# Patient Record
Sex: MALE | Race: WHITE
[De-identification: names, ages, dates, MRNs, and addresses within clinical notes are randomized per-mention and may not be internally consistent; named-entity substitution may affect disease eponyms.]

---

## 2020-01-30 LAB
BUN BLD-MCNC: 19 MG/DL (ref 7–18)
GLUCOSE SERPLBLD-MCNC: 101 MG/DL (ref 74–106)
HCT VFR BLD CALC: 42.2 % (ref 39.6–49)
LYMPHOCYTES # SPEC AUTO: 2.2 K/UL (ref 0.7–4.9)
PMV BLD: 8.5 FL (ref 7.6–11.3)
POTASSIUM SERPL-SCNC: 3.9 MMOL/L (ref 3.5–5.1)
RBC # BLD: 4.58 M/UL (ref 4.33–5.43)

## 2020-01-30 NOTE — RAD REPORT
EXAM DESCRIPTION:  RAD - Chest Pa And Lat (2 Views) - 1/30/2020 3:17 pm

 

CLINICAL HISTORY:  pre op, pending soft tissue mass removal from the back

 

COMPARISON:  No comparisons

 

TECHNIQUE:  Frontal and lateral views of the chest were obtained.

 

FINDINGS:  The lungs are clear.   Heart size is normal and central vasculature is within normal limit
s.  No pleural effusion or pneumothorax seen.  No acute bony finding noted.  No aortic abnormality.

 

IMPRESSION:  No acute cardiopulmonary process.

## 2020-01-31 NOTE — EKG
Test Date:    2020-01-30               Test Time:    15:06:52

Technician:   VIKI                                   

                                                     

MEASUREMENT RESULTS:                                       

Intervals:                                           

Rate:         61                                     

WA:           140                                    

QRSD:         90                                     

QT:           398                                    

QTc:          400                                    

Axis:                                                

P:            17                                     

WA:           140                                    

QRS:          2                                      

T:            10                                     

                                                     

INTERPRETIVE STATEMENTS:                                       

                                                     

Normal sinus rhythm

Possible Inferior infarct, age undetermined

Possible Anterior infarct, age undetermined

Abnormal ECG

Compared to ECG 03/08/2006 15:22:02

Myocardial infarct finding now present

Sinus bradycardia no longer present



Electronically Signed On 01-31-20 13:46:03 CST by Diaz Ferrer

## 2020-02-03 ENCOUNTER — HOSPITAL ENCOUNTER (OUTPATIENT)
Dept: HOSPITAL 97 - OR | Age: 65
Discharge: HOME | End: 2020-02-03
Attending: SURGERY
Payer: COMMERCIAL

## 2020-02-03 VITALS — OXYGEN SATURATION: 100 % | TEMPERATURE: 96.9 F | DIASTOLIC BLOOD PRESSURE: 75 MMHG | SYSTOLIC BLOOD PRESSURE: 117 MMHG

## 2020-02-03 DIAGNOSIS — L72.0: Primary | ICD-10-CM

## 2020-02-03 DIAGNOSIS — F32.9: ICD-10-CM

## 2020-02-03 DIAGNOSIS — Z80.3: ICD-10-CM

## 2020-02-03 DIAGNOSIS — Z88.6: ICD-10-CM

## 2020-02-03 DIAGNOSIS — Z82.49: ICD-10-CM

## 2020-02-03 PROCEDURE — 80048 BASIC METABOLIC PNL TOTAL CA: CPT

## 2020-02-03 PROCEDURE — 88305 TISSUE EXAM BY PATHOLOGIST: CPT

## 2020-02-03 PROCEDURE — 11404 EXC TR-EXT B9+MARG 3.1-4 CM: CPT

## 2020-02-03 PROCEDURE — 88304 TISSUE EXAM BY PATHOLOGIST: CPT

## 2020-02-03 PROCEDURE — 85025 COMPLETE CBC W/AUTO DIFF WBC: CPT

## 2020-02-03 PROCEDURE — 93005 ELECTROCARDIOGRAM TRACING: CPT

## 2020-02-03 PROCEDURE — 0JB70ZZ EXCISION OF BACK SUBCUTANEOUS TISSUE AND FASCIA, OPEN APPROACH: ICD-10-PCS

## 2020-02-03 PROCEDURE — 71046 X-RAY EXAM CHEST 2 VIEWS: CPT

## 2020-02-03 PROCEDURE — 36415 COLL VENOUS BLD VENIPUNCTURE: CPT

## 2020-02-03 RX ADMIN — CEFAZOLIN ONE MLS: 1 INJECTION, POWDER, FOR SOLUTION INTRAMUSCULAR; INTRAVENOUS; PARENTERAL at 09:45

## 2020-02-03 RX ADMIN — CEFAZOLIN ONE MLS: 1 INJECTION, POWDER, FOR SOLUTION INTRAMUSCULAR; INTRAVENOUS; PARENTERAL at 08:23

## 2020-02-03 NOTE — P.BOP
Preoperative diagnosis: tender back subcutaneous mass


Postoperative diagnosis: same


Primary procedure: Excisional biopsy of tender back subcutaneous mass 4X4CM


Secondary procedure: with layer closure


Assistant: Alize Begum)


Estimated blood loss: <10cc


Specimen: mass


Findings: mass


Anesthesia: General


Complications: None


Transferred to: Recovery Room


Condition: Good

## 2020-02-03 NOTE — DS
Date of Discharge:  02/03/2020



Diagnosis:  Tender back subcutaneous mass.



Procedure:  Excisional biopsy of tender back subcutaneous mass, 4 x 4 cm with layered closure.



Disposition:  Home.



Activity:  As tolerated.  No heavy lifting.



Followup:  Follow up in my office in 1 week.  Call for appointment [phone].  Keep area dry for 48 ho
urs, then may shower.  Keep Steri-Strips intact.



Medications:  See orders.





MICAH/MODL

DD:  02/03/2020 10:40:21Voice ID:  [id number]

DT:  02/03/2020 21:42:56Report ID:  [id number]

## 2020-02-03 NOTE — OP
Date of Procedure:  02/03/2020



Surgeon:  Malvin Rubio MD



Assistant:  SERA Suggs.



Preoperative Diagnosis:  Tender back subcutaneous mass.



Postoperative Diagnosis:  Tender back subcutaneous mass.



Procedure:  Excisional biopsy of tender back subcutaneous mass, 4 x 4 cm with layered closure.



Estimated Blood Loss:  Less than 10 mL.



Specimen:  Mass.



Anesthesia:  General plus local.



Indications:  This is a case of a 64-year-old patient, who comes to us with a deep tender mass in the
 back, patient wants it excised.  Benefits, alternatives, and risks of excision were fully explained,
 which include, but are not limited to infection, bleeding, damage to adjacent structures, anesthesia
 complication, recurrence, MI, and even death.  He also understands this may not relieve the symptoms
.  He might need more than one surgical intervention.  He has redness back and forth in that area.  KATHRYN sauer understands that if we find pus in that area, we might have to leave it open for secondary intentio
n closure.  He understood that area of concern was marked by me and the patient in the holding room.



Description Of Procedure:  Patient was brought to the operating room, placed in supine position.  Ane
sthesia was done without complication.  Patient was placed in lateral decubitus position with proper 
protection.  Back was prepped and draped in a sterile fashion.  A time-out was called.  A wedge incis
ion was made in the skin all the way down to deep subcutaneous tissue.  This goes all the way down to
 fascia of the muscle, but does not penetrate the muscle.  Mass was excised and the area was irrigate
d.  Then, we proceeded to close the area with 3-0 chromic deep inside, then 3-0 chromic in the more s
ubcutaneous area in layered closures, and then the skin was close last with 3-0 nylon.  Sponge count 
and instrument counts were correct.  Hemostasis was obtained before closure.  Patient was sent to [facility] in stable condition.





HM/MODL

DD:  02/03/2020 10:40:21Voice ID:  [id number]

DT:  02/03/2020 21:39:35Report ID:  [id number]

## 2021-05-20 ENCOUNTER — HOSPITAL ENCOUNTER (EMERGENCY)
Dept: HOSPITAL 97 - ER | Age: 66
Discharge: LEFT BEFORE DISCHARGE | End: 2021-05-20
Payer: SELF-PAY

## 2021-05-20 VITALS — OXYGEN SATURATION: 96 % | SYSTOLIC BLOOD PRESSURE: 119 MMHG | DIASTOLIC BLOOD PRESSURE: 84 MMHG | TEMPERATURE: 97.4 F

## 2021-05-20 DIAGNOSIS — Z53.21: Primary | ICD-10-CM

## 2021-05-20 PROCEDURE — 99281 EMR DPT VST MAYX REQ PHY/QHP: CPT

## 2021-05-20 NOTE — ER
Nurse's Notes                                                                                     

 [facility]                                                                 

Name: Lui Rodgers                                                                          

Age: 66 yrs                                                                                       

Sex: Male                                                                                         

: 1955                                                                                   

MRN: [id number]                                                                                   

Arrival Date: 2021                                                                          

Time: 14:30                                                                                       

Account#: [id number]                                                                            

Bed Waiting                                                                                       

Private MD:                                                                                       

Diagnosis:                                                                                        

                                                                                                  

Presentation:                                                                                     

                                                                                             

14:37 Chief complaint: Patient states: Insect bite to R hand 4th digit for 1 week. Saw his    ll1 

      doctors PA, started cephalexin Monday night. Site is getting more red and swollen since     

      Monday. No fever. Coronavirus screen: Client denies travel out of the [place] in the last      

      14 days. At this time, the client does not indicate any symptoms associated with            

      coronavirus-19. Ebola Screen: Patient denies travel to an Ebola-affected area in the 21     

      days before illness onset. Initial Sepsis Screen: Does the patient meet any 2 criteria?     

      No. Patient's initial sepsis screen is negative. Does the patient have a suspected          

      source of infection? Yes: Skin breakdown/wound. Risk Assessment: Do you want to hurt        

      yourself or someone else? Patient reports no desire to harm self or others. Onset of        

      symptoms was May 13, 2021.                                                                  

14:37 Method Of Arrival: Ambulatory                                                           ll1 

14:37 Acuity: ZIGGY 3                                                                           ll1 

                                                                                                  

Historical:                                                                                       

- Allergies:                                                                                      

14:42 Codeine;                                                                                ll1 

- PMHx:                                                                                           

14:42 None;                                                                                   ll1 

- PSHx:                                                                                           

14:42 foot SX-toe joint;                                                                      ll1 

                                                                                                  

- Immunization history:: Last tetanus immunization: unknown, Flu vaccine is up to date.           

- Social history:: Smoking status: Patient denies any tobacco usage or history of.                

                                                                                                  

                                                                                                  

Assessment:                                                                                       

19:34 Reassessment: called from the lobby, no answer.                                         ca1 

                                                                                                  

Vital Signs:                                                                                      

14:37  / 84; Pulse 98; Resp 17; Temp 97.4; Pulse Ox 96% ; Weight 77.11 kg; Height 5 ft. ll1 

      7 in. (170.18 cm); Pain 7/10;                                                               

14:37 Body Mass Index 26.63 (77.11 kg, 170.18 cm)                                             ll1 

                                                                                                  

ED Course:                                                                                        

14:30 Patient arrived in ED.                                                                  as  

14:41 Triage completed.                                                                       ll1 

14:42 Arm band placed on.                                                                     ll1 

                                                                                                  

Administered Medications:                                                                         

No medications were administered                                                                  

                                                                                                  

                                                                                                  

Outcome:                                                                                          

21:57 Patient left the ED.                                                                    iw  

                                                                                                  

Signatures:                                                                                       

Mildred Rubio Irene, RN                     RN   iw                                                   

Eliane Brenner, RN                        RN   ca1                                                  

Velma Lund RN                       RN   ll1                                                  

                                                                                                  

**************************************************************************************************